# Patient Record
Sex: MALE | Race: OTHER | Employment: FULL TIME | URBAN - METROPOLITAN AREA
[De-identification: names, ages, dates, MRNs, and addresses within clinical notes are randomized per-mention and may not be internally consistent; named-entity substitution may affect disease eponyms.]

---

## 2020-04-18 ENCOUNTER — HOSPITAL ENCOUNTER (EMERGENCY)
Age: 30
Discharge: HOME | End: 2020-04-18
Attending: EMERGENCY MEDICINE
Payer: SELF-PAY

## 2020-04-18 VITALS
DIASTOLIC BLOOD PRESSURE: 79 MMHG | OXYGEN SATURATION: 97 % | WEIGHT: 165.34 LBS | BODY MASS INDEX: 28.23 KG/M2 | HEIGHT: 64 IN | RESPIRATION RATE: 16 BRPM | HEART RATE: 78 BPM | SYSTOLIC BLOOD PRESSURE: 131 MMHG | TEMPERATURE: 99 F

## 2020-04-18 DIAGNOSIS — S41.112A LACERATION OF LEFT UPPER EXTREMITY, INITIAL ENCOUNTER: Primary | ICD-10-CM

## 2020-04-18 PROCEDURE — 75810000293 HC SIMP/SUPERF WND  RPR

## 2020-04-18 PROCEDURE — 90471 IMMUNIZATION ADMIN: CPT

## 2020-04-18 PROCEDURE — 74011250636 HC RX REV CODE- 250/636: Performed by: NURSE PRACTITIONER

## 2020-04-18 PROCEDURE — 90715 TDAP VACCINE 7 YRS/> IM: CPT | Performed by: NURSE PRACTITIONER

## 2020-04-18 PROCEDURE — 99282 EMERGENCY DEPT VISIT SF MDM: CPT

## 2020-04-18 RX ADMIN — TETANUS TOXOID, REDUCED DIPHTHERIA TOXOID AND ACELLULAR PERTUSSIS VACCINE, ADSORBED 0.5 ML: 5; 2.5; 8; 8; 2.5 SUSPENSION INTRAMUSCULAR at 11:19

## 2020-04-18 NOTE — ED TRIAGE NOTES
States he cut himself with a knife on left forearm while working. Unknown last tetanus. Wearing mask to triage.

## 2020-04-18 NOTE — ED PROVIDER NOTES
31 y/o m to ed with laceration left forearm. Was at work this am and missed, cut himself with his own knife. Suffered superficial lac left FA. Last tet ? Moiz Desai Speaks [place] only. April RN  for entire visit. History reviewed. No pertinent past medical history. History reviewed. No pertinent surgical history. History reviewed. No pertinent family history. Social History     Socioeconomic History    Marital status: SINGLE     Spouse name: Not on file    Number of children: Not on file    Years of education: Not on file    Highest education level: Not on file   Occupational History    Not on file   Social Needs    Financial resource strain: Not on file    Food insecurity     Worry: Not on file     Inability: Not on file    Transportation needs     Medical: Not on file     Non-medical: Not on file   Tobacco Use    Smoking status: Current Some Day Smoker   Substance and Sexual Activity    Alcohol use: Not on file    Drug use: Not on file    Sexual activity: Not on file   Lifestyle    Physical activity     Days per week: Not on file     Minutes per session: Not on file    Stress: Not on file   Relationships    Social connections     Talks on phone: Not on file     Gets together: Not on file     Attends Evangelical service: Not on file     Active member of club or organization: Not on file     Attends meetings of clubs or organizations: Not on file     Relationship status: Not on file    Intimate partner violence     Fear of current or ex partner: Not on file     Emotionally abused: Not on file     Physically abused: Not on file     Forced sexual activity: Not on file   Other Topics Concern    Not on file   Social History Narrative    Not on file         ALLERGIES: Patient has no known allergies. Review of Systems   Constitutional: Negative for chills and fever. HENT: Negative for facial swelling and mouth sores. Eyes: Negative for discharge and redness. Respiratory: Negative for cough. Cardiovascular: Negative for leg swelling. Gastrointestinal: Negative for vomiting. Musculoskeletal: Positive for myalgias. Negative for gait problem. Skin: Positive for wound. Negative for color change. Neurological: Negative for numbness. Psychiatric/Behavioral: Negative for confusion and decreased concentration. Vitals:    04/18/20 1013   BP: 131/79   Pulse: 78   Resp: 16   Temp: 99 °F (37.2 °C)   SpO2: 97%   Weight: 75 kg (165 lb 5.5 oz)   Height: 5' 3.78\" (1.62 m)            Physical Exam  Vitals signs and nursing note reviewed. Constitutional:       Appearance: Normal appearance. HENT:      Head: Normocephalic and atraumatic. Nose: Nose normal.      Mouth/Throat:      Mouth: Mucous membranes are moist.   Eyes:      Extraocular Movements: Extraocular movements intact. Pupils: Pupils are equal, round, and reactive to light. Neck:      Musculoskeletal: Normal range of motion. Cardiovascular:      Rate and Rhythm: Normal rate. Pulmonary:      Effort: Pulmonary effort is normal.   Musculoskeletal:         General: Signs of injury present. Arms:    Skin:     General: Skin is warm and dry. Capillary Refill: Capillary refill takes less than 2 seconds. Neurological:      General: No focal deficit present. Mental Status: He is alert and oriented to person, place, and time. Psychiatric:         Mood and Affect: Mood normal.         Behavior: Behavior normal.         Thought Content: Thought content normal.         Judgment: Judgment normal.          MDM  Number of Diagnoses or Management Options  Diagnosis management comments: Wound cleaned and closed. Will update [facility] [place] home with instructions for follow up.      Risk of Complications, Morbidity, and/or Mortality  Presenting problems: minimal  Diagnostic procedures: low  Management options: low    Patient Progress  Patient progress: stable         Wound Repair  Date/Time: 4/18/2020 10:54 AM  Performed by: [address] [place] [address] [address] provider: arsenio  Preparation: skin prepped with Betadine and sterile field established  Pre-procedure re-eval: Immediately prior to the procedure, the patient was reevaluated and found suitable for the planned procedure and any planned medications. Location details: left arm  Wound length:2.6 - 7.5 cm  Anesthesia: local infiltration    Anesthesia:  Local Anesthetic: lidocaine 1% without epinephrine  Anesthetic total: 8 mL  Foreign bodies: no foreign bodies  Debridement: none  Skin closure: 4-0 nylon  Number of sutures: 7  Technique: simple  Approximation: close  Dressing: 4x4, antibiotic ointment, gauze roll and non-adhesive packing strip  Patient tolerance: Patient tolerated the procedure well with no immediate complications  My total time at bedside, performing this procedure was 1-15 minutes.

## 2020-04-18 NOTE — DISCHARGE INSTRUCTIONS
Patient Education        Bañuelos cerrados con puntos de sutura: Instrucciones de cuidado  Cuts Closed With Stitches: Care Instructions  Instrucciones de cuidado  Un virginia puede ocurrir en cualquier parte del cuerpo. El médico utilizó puntos de sutura para cerrar el virginia. El [place] de puntos de sutura también ayuda a que el virginia sane y reduce la formación de cicatrices. A veces, se colocan cintas adhesivas conocidas kelly [employer]. Si el virginia es profundo y [employer], es posible que el médico haya colocado dos capas de puntos. La capa más profunda junta la parte profunda del virginia. Estos puntos se disuelven y no es necesario extraerlos. Los puntos en la capa superior son los que usted puede zachary sobre el virginia. Es probable que tenga enriqueta venda que cubre los puntos. Será necesario que [place] puntos, por lo general, al cabo de 7 a 14 hemal. El médico lo gary examinado minuciosamente, jmimy pueden presentarse problemas más tarde. Si nota algún problema o nuevos síntomas, busque tratamiento médico de inmediato. La atención de seguimiento es enriqueta parte clave de alcantar tratamiento y seguridad. Asegúrese de hacer y acudir a todas las citas, y llame a alcantar médico si está teniendo problemas. También es enriqueta buena idea saber los resultados de kusum exámenes y mantener enriqueta lista de los medicamentos que vivian. ¿Cómo puede cuidarse en el hogar? · Mantenga el virginia seco andriy las primeras 24 a 48 horas. Después de [id number], puede ducharse si el médico lo [place]. Seque el virginia con toques suaves de toalla. · No remoje el virginia, kelly en enriqueta courtney. Alcantar médico le indicará cuándo es seguro mojar el virginia. · Si alcantar médico le dijo cómo cuidarse el virginia, siga las instrucciones de alcantar médico. Si no le elmira instrucciones, siga estos consejos generales:  ? Después de las primeras 24 a 48 horas, lávese la alea alrededor del virginia con agua limpia 2 veces al día.  No use peróxido de hidrógeno (agua [place]) ni alcohol, los cuales pueden retrasar la sanación. ? Puede cubrirse el virginia con enriqueta capa delgada de vaselina y Alejandra Sera venda no adherente. ? Aplíquese más vaselina y [place] la venda según sea necesario. · Mantenga elevada la alea afectada sobre enriqueta almohada en cualquier momento que esté sentado o acostado andriy los 3 días siguientes. Trate de mantenerla por encima del nivel del corazón. [place] ayudará a reducir la hinchazón. · Evite toda actividad que pudiera hacer que el virginia se arminda de nuevo. · No se quite los puntos de sutura usted mismo. Jasso médico le dirá cuándo regresar para [employer] puntos de [place]. · Déjese las [employer]ri[employer] se caigan. · Sea markus con los medicamentos. [place] y siga todas las indicaciones de la [place]. ? Si el médico le recetó un analgésico (medicamento para el dolor), tómelo según las indicaciones. ? Si no está tomando un analgésico recetado, pregúntele a jasso médico si puede jono darius de [employer]. ¿Cuándo debe pedir ayuda? Llame a jasso médico ahora mismo o busque atención médica inmediata si:    · Tiene dolor nuevo, o el dolor empeora.     · La piel cercana al virginia está fría o pálida, o cambia de color.     · Siente hormigueo, debilitamiento o entumecimiento cerca del virginia.     · El virginia comienza a sangrar y la yesenia empapa la venda. Es normal que salgan pequeñas cantidades de yesenia.     · Tiene dificultad para  la alea cerca del virginia.     · Tiene síntomas de infección, tales kelly:  ? Aumento del dolor, la hinchazón, la temperatura o el enrojecimiento alrededor del virginia. ? Vetas rojizas que salen del virginia. ? Pus que sale del virginia. ? [employer]ker [employer] especial atención a los cambios en jasso maria l y asegúrese de comunicarse con jasso médico si:    · El virginia vuelve a abrirse.     · No mejora kelly se esperaba. ¿Dónde puede encontrar más información en inglés?   Vaya a [email or web address].[email or web address]  Antonio R217 en la búsqueda para aprender más acerca de \"Bañuelos cerrados con puntos de sutura: Instrucciones de cuidado. \"  Revisado: 26 junio, 2019Versión del contenido: 12.4  © 7270-2591 Healthwise, Incorporated. Las instrucciones de cuidado fueron adaptadas bajo licencia por [employer] (which disclaims liability or warranty for this information). Si usted tiene [place] afección médica o sobre estas instrucciones, siempre pregunte a jasso profesional de maria l. Healthwise, Incorporated niega toda garantía o responsabilidad por jasso uso de esta información. home with family   Keep wound clean and dry - wash wound daily with soap and running water under a sink - do not submerge until stitches out. After it dries from washing, apply a clean dressing. Ok to return to work but no heavy lifting until stitches are out.   Work note

## 2020-04-18 NOTE — LETTER
[address] [facility] EMERGENCY DEPT 
[address] [facility] [address] [address] 
[phone] Work/School Note Date: 4/18/2020 To Whom It May concern: 
 
Zach Mcnulty was seen and treated today in the emergency room by the following provider(s): 
Attending Provider: Rita Paniagua DO 
Nurse Practitioner: Eva Braxton NP. Zach Mcnulty may return to work on today with dressing to left forearm. Sincerely, Saintclair Murdoch, NP

## 2020-04-18 NOTE — LETTER
[address] [facility] EMERGENCY DEPT 
[address] [facility] [place] [address] 
[phone] Work/School Note Date: 4/18/2020 To Whom It May concern: 
 
Kelsey Keenan was seen and treated today in the emergency room by the following provider(s): 
Attending Provider: Shahram Ballesteros DO 
Nurse Practitioner: Rufus King NP. Kelsey Keenan may return to work today but NO LIFTING OVER 5 POUNDS UNTIL STITCHES ARE OUT. Sincerely, Lexine Peabody, NP

## 2020-04-18 NOTE — ED NOTES
I have reviewed discharge instructions with the patient. The patient verbalized understanding. Patient left ED via Discharge Method: ambulatory to Home with self. Opportunity for questions and clarification provided. Patient given 0 scripts. To continue your aftercare when you leave the hospital, you may receive an automated call from our care team to check in on how you are doing. This is a free service and part of our promise to provide the best care and service to meet your aftercare needs.  If you have questions, or wish to unsubscribe from this service please call [phone]. Thank you for Choosing our [facility] Emergency Department.